# Patient Record
Sex: MALE | Race: WHITE | NOT HISPANIC OR LATINO | Employment: FULL TIME | URBAN - METROPOLITAN AREA
[De-identification: names, ages, dates, MRNs, and addresses within clinical notes are randomized per-mention and may not be internally consistent; named-entity substitution may affect disease eponyms.]

---

## 2017-09-24 ENCOUNTER — APPOINTMENT (OUTPATIENT)
Dept: RADIOLOGY | Age: 62
End: 2017-09-24
Attending: PHYSICIAN ASSISTANT
Payer: OTHER MISCELLANEOUS

## 2017-09-24 ENCOUNTER — APPOINTMENT (OUTPATIENT)
Dept: URGENT CARE | Age: 62
End: 2017-09-24
Payer: OTHER MISCELLANEOUS

## 2017-09-24 ENCOUNTER — TRANSCRIBE ORDERS (OUTPATIENT)
Dept: ADMINISTRATIVE | Age: 62
End: 2017-09-24

## 2017-09-24 DIAGNOSIS — T14.90XA INJURY: ICD-10-CM

## 2017-09-24 DIAGNOSIS — T14.90XA INJURY: Primary | ICD-10-CM

## 2017-09-24 PROCEDURE — 99283 EMERGENCY DEPT VISIT LOW MDM: CPT | Performed by: PREVENTIVE MEDICINE

## 2017-09-24 PROCEDURE — 73564 X-RAY EXAM KNEE 4 OR MORE: CPT

## 2017-09-24 PROCEDURE — G0382 LEV 3 HOSP TYPE B ED VISIT: HCPCS | Performed by: PREVENTIVE MEDICINE

## 2017-10-06 ENCOUNTER — APPOINTMENT (OUTPATIENT)
Dept: URGENT CARE | Age: 62
End: 2017-10-06
Payer: OTHER MISCELLANEOUS

## 2017-10-06 PROCEDURE — 99213 OFFICE O/P EST LOW 20 MIN: CPT | Performed by: PREVENTIVE MEDICINE

## 2017-10-18 ENCOUNTER — GENERIC CONVERSION - ENCOUNTER (OUTPATIENT)
Dept: OTHER | Facility: OTHER | Age: 62
End: 2017-10-18

## 2017-10-18 DIAGNOSIS — S83.8X2A SPRAIN OF OTHER SPECIFIED PARTS OF LEFT KNEE, INITIAL ENCOUNTER: ICD-10-CM

## 2017-10-24 ENCOUNTER — GENERIC CONVERSION - ENCOUNTER (OUTPATIENT)
Dept: OTHER | Facility: OTHER | Age: 62
End: 2017-10-24

## 2017-11-29 ENCOUNTER — ALLSCRIPTS OFFICE VISIT (OUTPATIENT)
Dept: OTHER | Facility: OTHER | Age: 62
End: 2017-11-29

## 2017-11-29 DIAGNOSIS — S83.242A OTHER TEAR OF MEDIAL MENISCUS, CURRENT INJURY, LEFT KNEE, INITIAL ENCOUNTER: ICD-10-CM

## 2018-01-10 ENCOUNTER — GENERIC CONVERSION - ENCOUNTER (OUTPATIENT)
Dept: OTHER | Facility: OTHER | Age: 63
End: 2018-01-10

## 2018-01-10 ENCOUNTER — ALLSCRIPTS OFFICE VISIT (OUTPATIENT)
Dept: OTHER | Facility: OTHER | Age: 63
End: 2018-01-10

## 2018-01-10 NOTE — PROGRESS NOTES
Preliminary Nursing Report                Patient Information    Initial Encounter Entry Date:   2017 9:48 AM EST (Automated Transmission Automated Transmission)       Last Modified:   {Eagle Costello}              Legal Name: Joceline Smith Number:        YOB: 1955        Age (years): 63        Gender: M        Body Mass Index (BMI): 28 kg/m2        Height: 65 in  Weight: 169 lbs (77 kgs)           Address:   [address]              Phone: [phone]   (consent to leave messages)        Email:        Ethnicity: Decline to State        [employer]:        Marital Status:        Preferred Language: English        Race: Other Race                    Patient Insurance Information        Primary Insurance Information Carrier Name: {Primary  CarrierName}           Carrier Address:   {Primary  CarrierAddress}              Carrier Phone: {Primary  CarrierPhone}          Group Number: {Primary  GroupNumber}          Policy Number: {Primary  PolicyNumber}          Insured Name: {Primary  InsuredName}          Insured : {Primary  InsuredDOB}          Relationship to Insured: {Primary  RelationshiptoInsured}           Secondary Insurance Information Carrier Name: {Secondary  CarrierName}           Carrier Address:   {Secondary  CarrierAddress}              Carrier Phone: {Secondary  CarrierPhone}          Group Number: {Secondary  GroupNumber}          Policy Number: {Secondary  PolicyNumber}          Insured Name: {Secondary  InsuredName}          Insured : {Secondary  InsuredDOB}          Relationship to Insured: {Secondary  RelationshiptoInsured}                       Health Profile   Booking #:   Gonzalo Fernandes #: [id number]               DOS: 2017    Surgery : KNEE ARTHROSCOPY/SURGERY    Rosette Singletary Procedures/Notes:     Surgery Risk: Intermediate          Precautions          Allergies    No Known Drug Allergies             Medications Coumadin 6 MG Oral Tablet       Metoprolol Tartrate 50 MG Oral Tablet               Conditions    Acute medial meniscal tear, left, initial encounter       Injury of meniscus of left knee               Family History    None             Surgical History    None             Social History    Never a smoker                               Patient Instructions       Medical Procedure Risk  NPO Instructions   The day before surgery it is recommended to have a light dinner at your usual time and you are allowed a light snack early in the evening  Do not eat anything heavy or eat a big meal after 7pm  Do not eat or drink anything after midnight prior to your surgery  If you are supposed to take any of your medications, do so with a sip of water  Failure to follow these instructions can lead to an increased risk of lung complications and may result in a delay or cancellation of your procedure  If you have any questions, contact your institution for further instructions  No candy, no gum, no mints, no chewing tobacco          Metoprolol Tartrate 50 MG Oral Tablet  Medication Instruction (Beta Blocker) 3, 2, c, 4, 6, 5  Please continue to take this medication on your normal schedule  If this is an oral medication and you take in the morning, you may do so with a sip of water  Coumadin 6 MG Oral Tablet  Medication Instruction (Coumadin -Blood Thinners) 21, 22, 23  Please stop Coumadin for 5 days prior to surgery with prescribing physician consultation - bridging anticoagulation may be needed if you have an artificial heart valve  Testing Considerations       ? Coagulation Tests (PT/PTT/INR) t  Triggered by: Coumadin 6 MG Oral Tablet         ? Electrocardiogram (ECG) t  Patient does not need new test if normal ECG is present within the last six months and no change in clinical condition  Triggered by: Age or Facility Rec               Consultations       No recommendations for this classification  Miscellaneous Questions         Question: Are you able to walk up a flight of stairs, walk up a hill or do heavy housework WITHOUT having chest pain or shortness of breath? Answer: YES                   Allergies/Conditions/Medications Not Found        The following were not recognized by our system when generating the recommendations  Please consider if this would impact any preoperative protocols  ? Never a smoker                  Appointment Information         Date:    12/28/2017        Location:    [facility]        Address:           Directions:                      Footnotes revision 14      ?? Denotes a free-text entry  Legal Disclaimer: Any and all recommendations and services provided herein are designed to assist in the preoperative care of the patient  Nothing contained herein is designed to replace, eliminate or alleviate the responsibility of the attending physician to supervise and determine the patient?s preoperative care and course of treatment  Failure to provide complete, accurate information may negatively impact the system?s ability to recommend the proper preoperative protocol  THE ATTENDING PHYSICIAN IS RESPONSIBLE TO REVIEW THE SUGGESTED PREOPERATIVE PROTOCOLS/COURSE OF TREATMENT AND PRESCRIBE THE FINAL COURSE OF PREOPERATIVE TREATMENT IN CONSULTATION WITH THE PATIENT  THE ePREOP SYSTEM AND ITS MATERIALS ARE PROVIDED ? AS IS? WITHOUT WARRANTY OF ANY KIND, EXPRESS OR IMPLIED, INCLUDING, BUT NOT LIMITED TO, WARRANTIES OF PERFORMANCE OR MERCHANTABILITY OR FITNESS FOR A PARTICULAR PURPOSE  PATIENT AND PHYSICIANS HEREBY AGREE THAT THEIR USE OF THE MATERIALS AND RESOURCES ACT AS A CONSENT TO RELEASE AND WAIVE ePREOP FROM ANY AND ALL CLAIMS OF WARRANTY, TORT OR CONTRACT LAW OF ANY KIND

## 2018-01-13 VITALS
HEART RATE: 71 BPM | WEIGHT: 169 LBS | DIASTOLIC BLOOD PRESSURE: 62 MMHG | SYSTOLIC BLOOD PRESSURE: 121 MMHG | RESPIRATION RATE: 20 BRPM

## 2018-01-22 VITALS
DIASTOLIC BLOOD PRESSURE: 83 MMHG | SYSTOLIC BLOOD PRESSURE: 128 MMHG | WEIGHT: 165 LBS | HEIGHT: 65 IN | BODY MASS INDEX: 27.49 KG/M2 | RESPIRATION RATE: 20 BRPM | HEART RATE: 82 BPM

## 2018-01-23 VITALS
BODY MASS INDEX: 28.19 KG/M2 | HEART RATE: 80 BPM | WEIGHT: 169.38 LBS | DIASTOLIC BLOOD PRESSURE: 73 MMHG | RESPIRATION RATE: 20 BRPM | SYSTOLIC BLOOD PRESSURE: 114 MMHG

## 2018-01-23 RX ORDER — WARFARIN SODIUM 2 MG/1
6 TABLET ORAL
COMMUNITY

## 2018-01-23 RX ORDER — METOPROLOL SUCCINATE 50 MG/1
25 TABLET, EXTENDED RELEASE ORAL DAILY
COMMUNITY

## 2018-01-23 RX ORDER — ACETAMINOPHEN 500 MG
500 TABLET ORAL EVERY 6 HOURS PRN
COMMUNITY

## 2018-01-23 RX ORDER — MULTIVITAMIN
1 TABLET ORAL DAILY
COMMUNITY

## 2018-01-23 NOTE — PROGRESS NOTES
Preliminary Nursing Report                Patient Information    Initial Encounter Entry Date:   1/10/2018 9:28 AM EST (Automated Transmission Automated Transmission)       Last Modified:   {Eagle Costello}              Legal Name: Joceline Smith Number:        YOB: 1955        Age (years): 63        Gender: M        Body Mass Index (BMI): 28 kg/m2        Height: 65 in  Weight: 169 lbs (77 kgs)           Address:   [place] [id number]              Phone: [phone]   (consent to leave messages)        Email:        Ethnicity: Decline to State        [employer]oo:        Marital Status:        Preferred Language: English        Race: Other Race                    Patient Insurance Information        Primary Insurance Information Carrier Name: {Primary  CarrierName}           Carrier Address:   {Primary  CarrierAddress}              Carrier Phone: {Primary  CarrierPhone}          Group Number: {Primary  GroupNumber}          Policy Number: {Primary  PolicyNumber}          Insured Name: {Primary  InsuredName}          Insured : {Primary  InsuredDOB}          Relationship to Insured: {Primary  RelationshiptoInsured}           Secondary Insurance Information Carrier Name: {Secondary  CarrierName}           Carrier Address:   {Secondary  CarrierAddress}              Carrier Phone: {Secondary  CarrierPhone}          Group Number: {Secondary  GroupNumber}          Policy Number: {Secondary  PolicyNumber}          Insured Name: {Secondary  InsuredName}          Insured : {Secondary  InsuredDOB}          Relationship to Insured: {Secondary  RelationshiptoInsured}                       Health Profile   Booking #:   Amari Guzman #: [id number]               DOS: 2018    Surgery : KNEE ARTHROSCOPY/SURGERY    Saurav Singletary Procedures/Notes:     Surgery Risk: Intermediate          Precautions          Allergies    No Known Drug Allergies             Medications Coumadin 6 MG Oral Tablet       Metoprolol Tartrate 50 MG Oral Tablet               Conditions    Acute medial meniscal tear, left, initial encounter       Injury of meniscus of left knee               Family History    None             Surgical History    None             Social History    Never a smoker                               Patient Instructions       Medical Procedure Risk  NPO Instructions   The day before surgery it is recommended to have a light dinner at your usual time and you are allowed a light snack early in the evening  Do not eat anything heavy or eat a big meal after 7pm  Do not eat or drink anything after midnight prior to your surgery  If you are supposed to take any of your medications, do so with a sip of water  Failure to follow these instructions can lead to an increased risk of lung complications and may result in a delay or cancellation of your procedure  If you have any questions, contact your institution for further instructions  No candy, no gum, no mints, no chewing tobacco          Metoprolol Tartrate 50 MG Oral Tablet  Medication Instruction (Beta Blocker) 3, 2, c, 4, 6, 5  Please continue to take this medication on your normal schedule  If this is an oral medication and you take in the morning, you may do so with a sip of water  Coumadin 6 MG Oral Tablet  Medication Instruction (Coumadin -Blood Thinners) 21, 22, 23  Please stop Coumadin for 5 days prior to surgery with prescribing physician consultation - bridging anticoagulation may be needed if you have an artificial heart valve  Testing Considerations       ? Coagulation Tests (PT/PTT/INR) t  Triggered by: Coumadin 6 MG Oral Tablet         ? Electrocardiogram (ECG) t  Patient does not need new test if normal ECG is present within the last six months and no change in clinical condition  Triggered by: Age or Facility Rec               Consultations       No recommendations for this classification  Miscellaneous Questions         Question: Are you able to walk up a flight of stairs, walk up a hill or do heavy housework WITHOUT having chest pain or shortness of breath? Answer: YES                   Allergies/Conditions/Medications Not Found        The following were not recognized by our system when generating the recommendations  Please consider if this would impact any preoperative protocols  ? Never a smoker                  Appointment Information         Date:    01/25/2018        Location:    [facility]        Address:           Directions:                      Footnotes revision 14      ?? Denotes a free-text entry  Legal Disclaimer: Any and all recommendations and services provided herein are designed to assist in the preoperative care of the patient  Nothing contained herein is designed to replace, eliminate or alleviate the responsibility of the attending physician to supervise and determine the patient?s preoperative care and course of treatment  Failure to provide complete, accurate information may negatively impact the system?s ability to recommend the proper preoperative protocol  THE ATTENDING PHYSICIAN IS RESPONSIBLE TO REVIEW THE SUGGESTED PREOPERATIVE PROTOCOLS/COURSE OF TREATMENT AND PRESCRIBE THE FINAL COURSE OF PREOPERATIVE TREATMENT IN CONSULTATION WITH THE PATIENT  THE ePREOP SYSTEM AND ITS MATERIALS ARE PROVIDED ? AS IS? WITHOUT WARRANTY OF ANY KIND, EXPRESS OR IMPLIED, INCLUDING, BUT NOT LIMITED TO, WARRANTIES OF PERFORMANCE OR MERCHANTABILITY OR FITNESS FOR A PARTICULAR PURPOSE  PATIENT AND PHYSICIANS HEREBY AGREE THAT THEIR USE OF THE MATERIALS AND RESOURCES ACT AS A CONSENT TO RELEASE AND WAIVE ePREOP FROM ANY AND ALL CLAIMS OF WARRANTY, TORT OR CONTRACT LAW OF ANY KIND

## 2018-01-23 NOTE — PRE-PROCEDURE INSTRUCTIONS
Pre-Surgery Instructions:   Medication Instructions    acetaminophen (TYLENOL) 500 mg tablet Patient was instructed by Physician and understands   enoxaparin (LOVENOX) 80 mg/0 8 mL Patient was instructed by Physician and understands   metoprolol succinate (TOPROL-XL) 50 mg 24 hr tablet Patient was instructed by Physician and understands   Multiple Vitamin (MULTIVITAMIN) tablet Patient was instructed by Physician and understands   warfarin (JANTOVEN) 2 mg tablet Patient was instructed by Physician and understands  PT WILL TAKE METOPR AM OF SURG  LD ENOXAP INJ AM 1/24 (STOPPED WARFARIN)  INSTR ALSO ON ASC LOC, JUDY  CALL, SHOWER REV , BRING PHOTO ID, MED LIST, INS INFO  Acetaminophen Med Class     Continue to take this medication on your normal schedule  If this is an oral medication and you take it in the morning, then you may take this medicine with a sip of water  Beta blocker Med Class     Continue to take this heart medication on your normal schedule  If this is an oral medication and you take it in the morning, then you may take this medicine with a sip of water  Low Molecular Weight Heparin Med Class     Stop taking this medication at least 12-24 hours prior to surgery/procedure with prescribing Physician and Surgeon consultation  Warfarin Med Class     Consult with your Surgeon and prescribing Physician for exact timing of stopping this medication  Bridging anticoagulation may be needed if you have an artificial heart valve  Frequently, this medication is stopped 5 days prior to surgery  Approval to stop this medication should first come from your surgeon and/or prescribing physician

## 2018-01-24 ENCOUNTER — ANESTHESIA EVENT (OUTPATIENT)
Dept: PERIOP | Facility: HOSPITAL | Age: 63
End: 2018-01-24
Payer: OTHER MISCELLANEOUS

## 2018-01-25 ENCOUNTER — HOSPITAL ENCOUNTER (OUTPATIENT)
Facility: HOSPITAL | Age: 63
Setting detail: OUTPATIENT SURGERY
Discharge: HOME | End: 2018-01-25
Attending: ORTHOPAEDIC SURGERY | Admitting: ORTHOPAEDIC SURGERY
Payer: OTHER MISCELLANEOUS

## 2018-01-25 ENCOUNTER — ANESTHESIA (OUTPATIENT)
Dept: PERIOP | Facility: HOSPITAL | Age: 63
End: 2018-01-25
Payer: OTHER MISCELLANEOUS

## 2018-01-25 VITALS
SYSTOLIC BLOOD PRESSURE: 109 MMHG | RESPIRATION RATE: 20 BRPM | HEART RATE: 72 BPM | TEMPERATURE: 98.9 F | HEIGHT: 65 IN | WEIGHT: 169 LBS | BODY MASS INDEX: 28.16 KG/M2 | DIASTOLIC BLOOD PRESSURE: 79 MMHG | OXYGEN SATURATION: 98 %

## 2018-01-25 DIAGNOSIS — Z98.890 STATUS POST ARTHROSCOPY OF LEFT KNEE: Primary | ICD-10-CM

## 2018-01-25 LAB
ABO GROUP BLD: NORMAL
APTT PPP: 33 SECONDS (ref 23–35)
BLD GP AB SCN SERPL QL: NEGATIVE
INR PPP: 0.97 (ref 0.86–1.16)
PROTHROMBIN TIME: 12.9 SECONDS (ref 12.1–14.4)
RH BLD: POSITIVE
SPECIMEN EXPIRATION DATE: NORMAL

## 2018-01-25 PROCEDURE — 29881 ARTHRS KNE SRG MNISECTMY M/L: CPT | Performed by: ORTHOPAEDIC SURGERY

## 2018-01-25 PROCEDURE — 86900 BLOOD TYPING SEROLOGIC ABO: CPT | Performed by: ORTHOPAEDIC SURGERY

## 2018-01-25 PROCEDURE — 86901 BLOOD TYPING SEROLOGIC RH(D): CPT | Performed by: ORTHOPAEDIC SURGERY

## 2018-01-25 PROCEDURE — 85730 THROMBOPLASTIN TIME PARTIAL: CPT | Performed by: ORTHOPAEDIC SURGERY

## 2018-01-25 PROCEDURE — 86850 RBC ANTIBODY SCREEN: CPT | Performed by: ORTHOPAEDIC SURGERY

## 2018-01-25 PROCEDURE — 85610 PROTHROMBIN TIME: CPT | Performed by: ORTHOPAEDIC SURGERY

## 2018-01-25 RX ORDER — ONDANSETRON 2 MG/ML
4 INJECTION INTRAMUSCULAR; INTRAVENOUS ONCE AS NEEDED
Status: DISCONTINUED | OUTPATIENT
Start: 2018-01-25 | End: 2018-01-25 | Stop reason: HOSPADM

## 2018-01-25 RX ORDER — MIDAZOLAM HYDROCHLORIDE 1 MG/ML
INJECTION INTRAMUSCULAR; INTRAVENOUS AS NEEDED
Status: DISCONTINUED | OUTPATIENT
Start: 2018-01-25 | End: 2018-01-25 | Stop reason: SURG

## 2018-01-25 RX ORDER — PROPOFOL 10 MG/ML
INJECTION, EMULSION INTRAVENOUS AS NEEDED
Status: DISCONTINUED | OUTPATIENT
Start: 2018-01-25 | End: 2018-01-25 | Stop reason: SURG

## 2018-01-25 RX ORDER — OXYCODONE HYDROCHLORIDE 5 MG/1
TABLET ORAL
Qty: 30 TABLET | Refills: 0 | Status: CANCELLED | OUTPATIENT
Start: 2018-01-25

## 2018-01-25 RX ORDER — SODIUM CHLORIDE, SODIUM LACTATE, POTASSIUM CHLORIDE, CALCIUM CHLORIDE 600; 310; 30; 20 MG/100ML; MG/100ML; MG/100ML; MG/100ML
20 INJECTION, SOLUTION INTRAVENOUS CONTINUOUS
Status: DISCONTINUED | OUTPATIENT
Start: 2018-01-25 | End: 2018-01-25 | Stop reason: HOSPADM

## 2018-01-25 RX ORDER — LIDOCAINE HYDROCHLORIDE AND EPINEPHRINE 10; 10 MG/ML; UG/ML
INJECTION, SOLUTION INFILTRATION; PERINEURAL AS NEEDED
Status: DISCONTINUED | OUTPATIENT
Start: 2018-01-25 | End: 2018-01-25 | Stop reason: HOSPADM

## 2018-01-25 RX ORDER — FENTANYL CITRATE/PF 50 MCG/ML
25 SYRINGE (ML) INJECTION
Status: CANCELLED | OUTPATIENT
Start: 2018-01-25

## 2018-01-25 RX ORDER — OXYCODONE HYDROCHLORIDE 5 MG/1
TABLET ORAL
Qty: 30 TABLET | Refills: 0 | Status: SHIPPED | OUTPATIENT
Start: 2018-01-25 | End: 2018-04-25

## 2018-01-25 RX ORDER — METHYLPREDNISOLONE ACETATE 40 MG/ML
INJECTION, SUSPENSION INTRA-ARTICULAR; INTRALESIONAL; INTRAMUSCULAR; SOFT TISSUE AS NEEDED
Status: DISCONTINUED | OUTPATIENT
Start: 2018-01-25 | End: 2018-01-25 | Stop reason: HOSPADM

## 2018-01-25 RX ORDER — LIDOCAINE HYDROCHLORIDE 10 MG/ML
INJECTION, SOLUTION INFILTRATION; PERINEURAL AS NEEDED
Status: DISCONTINUED | OUTPATIENT
Start: 2018-01-25 | End: 2018-01-25 | Stop reason: SURG

## 2018-01-25 RX ORDER — BUPIVACAINE HYDROCHLORIDE 2.5 MG/ML
INJECTION, SOLUTION INFILTRATION; PERINEURAL AS NEEDED
Status: DISCONTINUED | OUTPATIENT
Start: 2018-01-25 | End: 2018-01-25 | Stop reason: HOSPADM

## 2018-01-25 RX ORDER — EPHEDRINE SULFATE 50 MG/ML
INJECTION, SOLUTION INTRAVENOUS AS NEEDED
Status: DISCONTINUED | OUTPATIENT
Start: 2018-01-25 | End: 2018-01-25 | Stop reason: SURG

## 2018-01-25 RX ORDER — FENTANYL CITRATE/PF 50 MCG/ML
25 SYRINGE (ML) INJECTION
Status: DISCONTINUED | OUTPATIENT
Start: 2018-01-25 | End: 2018-01-25 | Stop reason: HOSPADM

## 2018-01-25 RX ORDER — ONDANSETRON 2 MG/ML
INJECTION INTRAMUSCULAR; INTRAVENOUS AS NEEDED
Status: DISCONTINUED | OUTPATIENT
Start: 2018-01-25 | End: 2018-01-25 | Stop reason: SURG

## 2018-01-25 RX ORDER — FENTANYL CITRATE 50 UG/ML
INJECTION, SOLUTION INTRAMUSCULAR; INTRAVENOUS AS NEEDED
Status: DISCONTINUED | OUTPATIENT
Start: 2018-01-25 | End: 2018-01-25 | Stop reason: SURG

## 2018-01-25 RX ORDER — ONDANSETRON 2 MG/ML
4 INJECTION INTRAMUSCULAR; INTRAVENOUS ONCE AS NEEDED
Status: CANCELLED | OUTPATIENT
Start: 2018-01-25

## 2018-01-25 RX ADMIN — SODIUM CHLORIDE, SODIUM LACTATE, POTASSIUM CHLORIDE, AND CALCIUM CHLORIDE: .6; .31; .03; .02 INJECTION, SOLUTION INTRAVENOUS at 10:00

## 2018-01-25 RX ADMIN — FENTANYL CITRATE 25 MCG: 50 INJECTION INTRAMUSCULAR; INTRAVENOUS at 12:39

## 2018-01-25 RX ADMIN — EPHEDRINE SULFATE 5 MG: 50 INJECTION, SOLUTION INTRAMUSCULAR; INTRAVENOUS; SUBCUTANEOUS at 11:35

## 2018-01-25 RX ADMIN — CEFAZOLIN SODIUM 2000 MG: 2 SOLUTION INTRAVENOUS at 11:30

## 2018-01-25 RX ADMIN — EPHEDRINE SULFATE 5 MG: 50 INJECTION, SOLUTION INTRAMUSCULAR; INTRAVENOUS; SUBCUTANEOUS at 11:50

## 2018-01-25 RX ADMIN — LIDOCAINE HYDROCHLORIDE 50 MG: 10 INJECTION, SOLUTION INFILTRATION; PERINEURAL at 11:20

## 2018-01-25 RX ADMIN — FENTANYL CITRATE 25 MCG: 50 INJECTION, SOLUTION INTRAMUSCULAR; INTRAVENOUS at 11:24

## 2018-01-25 RX ADMIN — HYDROMORPHONE HYDROCHLORIDE 0.25 MG: 1 INJECTION, SOLUTION INTRAMUSCULAR; INTRAVENOUS; SUBCUTANEOUS at 12:15

## 2018-01-25 RX ADMIN — FENTANYL CITRATE 50 MCG: 50 INJECTION, SOLUTION INTRAMUSCULAR; INTRAVENOUS at 11:53

## 2018-01-25 RX ADMIN — MIDAZOLAM HYDROCHLORIDE 2 MG: 1 INJECTION, SOLUTION INTRAMUSCULAR; INTRAVENOUS at 11:10

## 2018-01-25 RX ADMIN — FENTANYL CITRATE 25 MCG: 50 INJECTION, SOLUTION INTRAMUSCULAR; INTRAVENOUS at 11:20

## 2018-01-25 RX ADMIN — ONDANSETRON 4 MG: 2 INJECTION INTRAMUSCULAR; INTRAVENOUS at 12:16

## 2018-01-25 RX ADMIN — PROPOFOL 200 MG: 10 INJECTION, EMULSION INTRAVENOUS at 11:20

## 2018-01-25 RX ADMIN — EPHEDRINE SULFATE 5 MG: 50 INJECTION, SOLUTION INTRAMUSCULAR; INTRAVENOUS; SUBCUTANEOUS at 11:40

## 2018-01-25 RX ADMIN — SODIUM CHLORIDE, SODIUM LACTATE, POTASSIUM CHLORIDE, AND CALCIUM CHLORIDE 20 ML/HR: .6; .31; .03; .02 INJECTION, SOLUTION INTRAVENOUS at 08:42

## 2018-01-25 RX ADMIN — SODIUM CHLORIDE, SODIUM LACTATE, POTASSIUM CHLORIDE, AND CALCIUM CHLORIDE 20 ML/HR: .6; .31; .03; .02 INJECTION, SOLUTION INTRAVENOUS at 12:55

## 2018-01-25 NOTE — H&P (VIEW-ONLY)
Preliminary Nursing Report                Patient Information    Initial Encounter Entry Date:   1/10/2018 9:28 AM EST (Automated Transmission Automated Transmission)       Last Modified:                 Legal Name: Joceline Mobiveil Sarah Number:        YOB: 1955        Age (years): 63        Gender: M        Body Mass Index (BMI): 28 kg/m2        Height: 65 in  Weight: 169 lbs (77 kgs)           Address:   [address]              Phone: [phone]   (consent to leave messages)        Email:        Ethnicity: Decline to State        Jainism:        Marital Status:        Preferred Language: English        Race: Other Race                    Patient Insurance Information        Primary Insurance Information Carrier Name:            Carrier Address:                 Carrier Phone:           Group Number:           Policy Number:           Insured Name:           Insured :           Relationship to Insured:            Secondary Insurance Information Carrier Name:            Carrier Address:                 Carrier Phone:           Group Number:           Policy Number:           Insured Name:           Mauricio Bowers :           Relationship to Insured:                        [id number]  #:   Tyronechetna Clau #: [id number]               DOS: 2018    Surgery : KNEE ARTHROSCOPY/SURGERY    Reese Ranjana     Add'l Procedures/Notes:     Surgery Risk: Intermediate          Precautions          Allergies    No Known Drug Allergies             Medications    Coumadin 6 MG Oral Tablet       Metoprolol Tartrate 50 MG Oral Tablet               Conditions    Acute medial meniscal tear, left, initial encounter       Injury of meniscus of left knee               Family History    None             Surgical History    None             Social History    Never a smoker                               Patient Instructions       Medical Procedure Risk  NPO Instructions   The day before surgery it is recommended to have a light dinner at your usual time and you are allowed a light snack early in the evening  Do not eat anything heavy or eat a big meal after 7pm  Do not eat or drink anything after midnight prior to your surgery  If you are supposed to take any of your medications, do so with a sip of water  Failure to follow these instructions can lead to an increased risk of lung complications and may result in a delay or cancellation of your procedure  If you have any questions, contact your institution for further instructions  No candy, no gum, no mints, no chewing tobacco          Metoprolol Tartrate 50 MG Oral Tablet  Medication Instruction (Beta Blocker) 3, 2, c, 4, 6, 5  Please continue to take this medication on your normal schedule  If this is an oral medication and you take in the morning, you may do so with a sip of water  Coumadin 6 MG Oral Tablet  Medication Instruction (Coumadin -Blood Thinners) 21, 22, 23  Please stop Coumadin for 5 days prior to surgery with prescribing physician consultation - bridging anticoagulation may be needed if you have an artificial heart valve  Testing Considerations       ? Coagulation Tests (PT/PTT/INR) t  Triggered by: Coumadin 6 MG Oral Tablet         ? Electrocardiogram (ECG) t  Patient does not need new test if normal ECG is present within the last six months and no change in clinical condition  Triggered by: Age or Facility Rec               Consultations       No recommendations for this classification  Miscellaneous Questions         Question: Are you able to walk up a flight of stairs, walk up a hill or do heavy housework WITHOUT having chest pain or shortness of breath? Answer: YES                   Allergies/Conditions/Medications Not Found        The following were not recognized by our system when generating the recommendations   Please consider if this would impact any preoperative protocols  ? Never a smoker                  Appointment Information         Date:    01/25/2018        Location:    [facility]        Address:           Directions:                      Footnotes revision 14      ?? Denotes a free-text entry  Legal Disclaimer: Any and all recommendations and services provided herein are designed to assist in the preoperative care of the patient  Nothing contained herein is designed to replace, eliminate or alleviate the responsibility of the attending physician to supervise and determine the patient?s preoperative care and course of treatment  Failure to provide complete, accurate information may negatively impact the system?s ability to recommend the proper preoperative protocol  THE ATTENDING PHYSICIAN IS RESPONSIBLE TO REVIEW THE SUGGESTED PREOPERATIVE PROTOCOLS/COURSE OF TREATMENT AND PRESCRIBE THE FINAL COURSE OF PREOPERATIVE TREATMENT IN CONSULTATION WITH THE PATIENT  THE ePREOP SYSTEM AND ITS MATERIALS ARE PROVIDED ? AS IS? WITHOUT WARRANTY OF ANY KIND, EXPRESS OR IMPLIED, INCLUDING, BUT NOT LIMITED TO, WARRANTIES OF PERFORMANCE OR MERCHANTABILITY OR FITNESS FOR A PARTICULAR PURPOSE  PATIENT AND PHYSICIANS HEREBY AGREE THAT THEIR USE OF THE MATERIALS AND RESOURCES ACT AS A CONSENT TO RELEASE AND WAIVE ePREOP FROM ANY AND ALL CLAIMS OF WARRANTY, TORT OR CONTRACT LAW OF ANY KIND

## 2018-01-25 NOTE — OP NOTE
[id number]                                                                                                        Iveth Beavers                                                                                                         Unit/Bed#:   PreOp Dx: left Medial meniscal tear     Postop Dx: left Medial meniscal tear    Procedure: left knee arthroscopy, partial Medial meniscectomy, synovectomy, chondroplasty    Surgeon: Ailin Casey MD, SCARLET Martinez    Fluids:     EBL: Minimal    Drains: None    Specimens: None    Complications: None    Condition: Extubated, stable, just to the postanesthesia care unit    Implant: None    63 y o  male , who presents at this time, secondary to left  knee pain, mechanical symptoms of locking  Radiographs were significant for Medial meniscal tears  Patient failed conservative treatment therefore, he is presenting for operative intervention for this pathology  The patient was told of all the pros and cons of operative and nonoperative intervention  Some of the complications of operative intervention include infection, bleeding, scarring, nerve injury, vascular injury, continued pain, decreased range of motion, DVT, PE death, loss of limb, need for further surgery, not obtain an results  The patient wished  Patient did consent for operative intervention for this pathology  Patient was brought to the operating room  Patient was anesthetized as anesthesia team  Patient was prepped and draped in normal sterile fashion  After this was done, we did conduct a time out to make sure correct  Patient was in the room, prepped marked and draped  Implants were in the room, Rep  For the implants were present, DVT prophylaxis and antibiotics were dressed  1% lidocaine with epinephrine was injected to the anterolateral and anteromedial portal sites  Our anterolateral incision was made through skin and fatty tissue and capsular region   Straight hemostat was used to widen our capsule  This was used as a camera portal  Findings as follows    Patellofemoral joint line: 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    Lateral gutter:  No loose bodies    Medial gutter:  No loose bodies    Lateral tibiofemoral compartment:   No chondromalacia noted over the tibial plateau or the lateral femoral condyle  Menisci and noted to be intact    Intercondylar notch:  ACL and PCL are intact    Medial tibiofemoral compartment:  Large radial tear of the body/posterior horn region of the medial meniscus; chondromalacia grade 2/3 medial femoral condyle    An anteromedial incision was made for our working portal   We were able to  Use biters and Cinthya in order to debride the meniscus  A portion of the meniscus had to be debrided all the way towards the capsular region  We did use a shaver to do chondroplasty and synovectomy was done sparingly in order to allow for better visualization    We did suction  The intra-articular region of the fluid  3 0 nylon sutures were used to reapproximate our 2 incision sites  Lidocaine, Marcaine, and 40 mg of Depo-Medrol was administered to intra-articular region  Wounds were cleaned and dried  Xeroform, 4 x 4, ABDs and web roll, and Ace bandage was placed   Patient was subsequently awakened as per anesthesia team, and noted to be in stable condition and transferred to postanesthesia care unit

## 2018-01-25 NOTE — DISCHARGE INSTRUCTIONS
Discharge Instructions - [address] 63 y o  male MRN: [id number]  Unit/Bed#: Operating Room    Weight Bearing Status:                                           Weight bearing as tolerated left lower extremity    Pain:  Continue analgesics as directed    Dressing Instructions: May remove dressing 01/28/2018  Keep incision clean, dry and intact until follow up appointment  PT/OT:  Continue PT/OT on outpatient basis as directed    Appt Instructions: If you do not have your appointment, please call the clinic at [phone] to f/u with Dr Javed Honeycutt in 2 weeks  Otherwise followup as scheduled below:      Contact the office sooner if you experience any increased numbness/tingling in the extremities

## 2018-01-25 NOTE — ANESTHESIA POSTPROCEDURE EVALUATION
Post-Op Assessment Note      CV Status:  Stable    Mental Status:  Alert and awake    Hydration Status:  Stable    PONV Controlled:  Controlled    Airway Patency:  Patent    Post Op Vitals Reviewed: Yes          Staff: CRNA           BP (P) 124/58 (01/25/18 1230)    Temp (P) 99 1 °F (37 3 °C) (01/25/18 1230)    Pulse (P) 67 (01/25/18 1230)   Resp (P) 18 (01/25/18 1230)    SpO2

## 2018-01-25 NOTE — ANESTHESIA PREPROCEDURE EVALUATION
Review of Systems/Medical History  Patient summary reviewed        Cardiovascular  Dysrhythmias, atrial fibrillation,   Comment: H/O cardiac surgery,  Pulmonary       GI/Hepatic      Comment: Gilbert's          Endo/Other    Comment: Henry's syndrome    GYN       Hematology   Musculoskeletal       Neurology   Psychology                Anesthesia Plan  ASA Score- 3     Anesthesia Type- general with ASA Monitors  Additional Monitors:   Airway Plan: LMA  Plan Factors-    Induction- intravenous  Postoperative Plan-     Informed Consent- Anesthetic plan and risks discussed with patient

## 2018-01-26 NOTE — TELEPHONE ENCOUNTER
Patient is calling again because he needs a script for therapy  Patient will be going to [facility] on [address] in [facility]  The fax # is [phone]  Please let patient know when this is faxed

## 2018-01-29 NOTE — TELEPHONE ENCOUNTER
Patient aware that PT was ordered and we will fax the RX to [facility] on [address] in [facility]  Patient would like to know if he is okay to shower as he has a wick in his leg  Please advise

## 2018-01-30 ENCOUNTER — TELEPHONE (OUTPATIENT)
Dept: OBGYN CLINIC | Facility: HOSPITAL | Age: 63
End: 2018-01-30

## 2018-01-30 DIAGNOSIS — Z98.890 S/P ARTHROSCOPY: Primary | ICD-10-CM

## 2018-01-30 NOTE — TELEPHONE ENCOUNTER
Caller: Debbie Portlilos  Call back number: [phone]  Fax number: [phone]  Patient's doctor: Dr Armani Saunders    Patient had surgery 1/25  He has not started PT yet  He is unsure when he is supposed to start that  Patient would like to speak to someone about this  Red Batters needs a script for Pt, please fax   Please advise

## 2018-02-01 NOTE — TELEPHONE ENCOUNTER
Spoke to pt and relayed the message  He was concerned because it was a white string coming out along with the black sutures  Melo Gaines was right here as I was talking to the pt and she advised that that's what they look like  I relayed that message to pt as well

## 2018-02-07 ENCOUNTER — OFFICE VISIT (OUTPATIENT)
Dept: OBGYN CLINIC | Facility: MEDICAL CENTER | Age: 63
End: 2018-02-07

## 2018-02-07 VITALS
SYSTOLIC BLOOD PRESSURE: 114 MMHG | HEART RATE: 80 BPM | HEIGHT: 65 IN | WEIGHT: 165 LBS | DIASTOLIC BLOOD PRESSURE: 74 MMHG | BODY MASS INDEX: 27.49 KG/M2

## 2018-02-07 DIAGNOSIS — Z98.890 S/P LEFT KNEE ARTHROSCOPY: Primary | ICD-10-CM

## 2018-02-07 PROCEDURE — 99024 POSTOP FOLLOW-UP VISIT: CPT | Performed by: ORTHOPAEDIC SURGERY

## 2018-02-07 NOTE — PROGRESS NOTES
63 y o male status post left knee arthroscopy partial medial meniscectomy and associated procedures  Patient states that his pain is improving he does have some pain    Review of Systems  Review of systems negative unless otherwise specified in HPI    Past Medical History  Past Medical History:   Diagnosis Date    A-fib [facility])     Acquired solitary kidney     from birth   [facility] Gilbert's syndrome     Groin abscess     current        abnormal labs per patient    Henry's syndrome     Scoliosis        Past Surgical History  Past Surgical History:   Procedure Laterality Date    CARDIAC SURGERY      mitral valve' pericardial window    CHOLECYSTECTOMY      HERNIA REPAIR      IN KNEE SCOPE,MED/LAT MENISECTOMY Left 1/25/2018    Procedure: KNEE ARTHROSCOPY, PARTIAL MEDIAL MENISCECTOMY, CHONDROPLASTY;  Surgeon: Mitra Hardy MD;  [facility];  Service: Orthopedics    ROTATOR CUFF REPAIR         Current Medications  Current Outpatient Prescriptions on File Prior to Visit   Medication Sig Dispense Refill    acetaminophen (TYLENOL) 500 mg tablet Take 500 mg by mouth every 6 (six) hours as needed for mild pain      enoxaparin (LOVENOX) 80 mg/0 8 mL Inject 80 mg under the skin 2 (two) times a day 3 days preop      metoprolol succinate (TOPROL-XL) 50 mg 24 hr tablet Take 25 mg by mouth daily      Misc  Devices (CRUTCHES-ALUMINUM) MISC 1 Units by Does not apply route continuous for 30 days 2 each 0    Multiple Vitamin (MULTIVITAMIN) tablet Take 1 tablet by mouth daily      oxyCODONE (ROXICODONE) 5 mg immediate release tablet Take 1-2 tablets every 4-6 hrs as needed for pain control 30 tablet 0    warfarin (JANTOVEN) 2 mg tablet Take 6 mg by mouth daily       No current facility-administered medications on file prior to visit          Recent Labs [facility] [address])  @LABALLVALUEIP(HCT,HGB,WBC,PT,INR,ESR,CRP,GLUCOSE,HGBA1C)@      Physical exam  Left knee:  Incision site well approximated  Minimal effusion  Mild medial joint line tenderness  No lateral joint line tenderness  Neurologically and vascularly intact distally    Imaging      Procedure      Assessment/Plan:   62 y o male status post left knee arthroscopy partial medial meniscectomy, chondroplasty  Plan is as follows: He weightbearing as tolerated    Physical therapy    Follow-up in 4 weeks    Discussed treatment plan with patient and he is in agreement treatment plan    Thank you

## 2018-02-08 ENCOUNTER — TELEPHONE (OUTPATIENT)
Dept: OBGYN CLINIC | Facility: HOSPITAL | Age: 63
End: 2018-02-08

## 2018-02-08 NOTE — TELEPHONE ENCOUNTER
Bairon Daiz is calling wondering what the patients work status is as of his last visit  Would like the note faxed to [phone]      Tonie patient

## 2018-02-26 NOTE — MISCELLANEOUS
Message  Return to work or school:   Isiah Baltazar is under my professional care  He was seen in my office on 01/10/2018    He is not able to return to work until After further follow-up      Bhargav Cardona  Signatures   Electronically signed by :  Alicia Velarde, [facility]; Jan 12 2018  2:38PM EST                       (Author)

## 2018-03-21 ENCOUNTER — OFFICE VISIT (OUTPATIENT)
Dept: OBGYN CLINIC | Facility: MEDICAL CENTER | Age: 63
End: 2018-03-21

## 2018-03-21 VITALS
HEART RATE: 73 BPM | SYSTOLIC BLOOD PRESSURE: 106 MMHG | RESPIRATION RATE: 18 BRPM | WEIGHT: 170 LBS | BODY MASS INDEX: 28.29 KG/M2 | DIASTOLIC BLOOD PRESSURE: 71 MMHG

## 2018-03-21 DIAGNOSIS — Z09 POSTOP CHECK: Primary | ICD-10-CM

## 2018-03-21 DIAGNOSIS — S83.212D BUCKET-HANDLE TEAR OF MEDIAL MENISCUS OF LEFT KNEE AS CURRENT INJURY, SUBSEQUENT ENCOUNTER: ICD-10-CM

## 2018-03-21 PROCEDURE — 99024 POSTOP FOLLOW-UP VISIT: CPT | Performed by: ORTHOPAEDIC SURGERY

## 2018-03-21 RX ORDER — ACETAMINOPHEN AND CODEINE PHOSPHATE 300; 30 MG/1; MG/1
1 TABLET ORAL EVERY 4 HOURS PRN
Qty: 30 TABLET | Refills: 0 | Status: SHIPPED | OUTPATIENT
Start: 2018-03-21

## 2018-03-22 NOTE — PROGRESS NOTES
63 y o male  Left knee arthroscopy partial medial meniscectomy and associated procedures  Patient states the wound has discomfort in the pattern of the anterior band going from lateral to medial   States that he does have some swelling at times  Review of Systems  Review of systems negative unless otherwise specified in HPI    Past Medical History  Past Medical History:   Diagnosis Date    A-fib [facility])     Acquired solitary kidney     from birth   Maximo Polio Gilbert's syndrome     Groin abscess     current        abnormal labs per patient    Henry's syndrome     Scoliosis        Past Surgical History  Past Surgical History:   Procedure Laterality Date    CARDIAC SURGERY      mitral valve' pericardial window    CHOLECYSTECTOMY      HERNIA REPAIR      [place] KNEE SCOPE,MED/LAT MENISECTOMY Left 1/25/2018    Procedure: KNEE ARTHROSCOPY, PARTIAL MEDIAL MENISCECTOMY, CHONDROPLASTY;  Surgeon: Lolis Ugarte MD;  [facility];  Service: Orthopedics    ROTATOR CUFF REPAIR         Current Medications  Current Outpatient Prescriptions on File Prior to Visit   Medication Sig Dispense Refill    acetaminophen (TYLENOL) 500 mg tablet Take 500 mg by mouth every 6 (six) hours as needed for mild pain      metoprolol succinate (TOPROL-XL) 50 mg 24 hr tablet Take 25 mg by mouth daily      Multiple Vitamin (MULTIVITAMIN) tablet Take 1 tablet by mouth daily      warfarin (JANTOVEN) 2 mg tablet Take 6 mg by mouth daily      enoxaparin (LOVENOX) 80 mg/0 8 mL Inject 80 mg under the skin 2 (two) times a day 3 days preop      oxyCODONE (ROXICODONE) 5 mg immediate release tablet Take 1-2 tablets every 4-6 hrs as needed for pain control 30 tablet 0     No current facility-administered medications on file prior to visit          Recent Labs [facility])    0  Lab Value Date/Time   INR 0 97 01/25/2018 0819         Physical exam  [unfilled]    Left knee:   Incision sites well healed   Minimal effusion  Medial lateral joint line tenderness  Stable to coronal and sagittal plane stress  Positive patellar grind test  Neurologically and vascularly intact distally    Imaging      Procedure      Assessment/Plan:   62 y o male  Status post left knee arthroscopy partial medial meniscectomy  Plan is as follows:    Weightbearing as tolerated    Physical therapy    Follow-up in 4 weeks at this time patient continues to be symptomatic, I will give low-dose steroid injection prior to returning back to work    Discussed treatment plan with patient and  he is in agreement treatment plan    Thank you

## 2018-03-26 ENCOUNTER — TELEPHONE (OUTPATIENT)
Dept: OBGYN CLINIC | Facility: HOSPITAL | Age: 63
End: 2018-03-26

## 2018-03-26 NOTE — TELEPHONE ENCOUNTER
[employer] is calling letting us know they need a current work status for this patient as of his last visit       Tonie gardner    Fax- [phone]

## 2018-03-26 NOTE — TELEPHONE ENCOUNTER
Alka Lebron called from [facility]  She is questioning if the pt really needs work hardening or is work conditioning okay? If he needs work hardening then he will have to be sent to another facility because the one he is currently using doesn't do work hardening   Alka Lebron can be reached at [phone] or fax [phone]

## 2018-03-30 DIAGNOSIS — Z98.890 STATUS POST SURGERY: Primary | ICD-10-CM

## 2018-04-25 ENCOUNTER — OFFICE VISIT (OUTPATIENT)
Dept: OBGYN CLINIC | Facility: MEDICAL CENTER | Age: 63
End: 2018-04-25
Payer: OTHER MISCELLANEOUS

## 2018-04-25 VITALS
SYSTOLIC BLOOD PRESSURE: 120 MMHG | HEART RATE: 69 BPM | BODY MASS INDEX: 28.56 KG/M2 | RESPIRATION RATE: 20 BRPM | WEIGHT: 171.6 LBS | DIASTOLIC BLOOD PRESSURE: 73 MMHG

## 2018-04-25 DIAGNOSIS — Z98.890 STATUS POST SURGERY: Primary | ICD-10-CM

## 2018-04-25 PROCEDURE — 99024 POSTOP FOLLOW-UP VISIT: CPT | Performed by: ORTHOPAEDIC SURGERY

## 2018-04-25 NOTE — LETTER
April 25, 2018     Patient: Camilo Flaherty   YOB: 1955   Date of Visit: 4/25/2018       To Whom it May Concern:    Rosita Cui is under my professional care  He was seen in my office on 4/25/2018  He may return to work on April 30, 2018  No restrictions at this time  If you have any questions or concerns, please don't hesitate to call           Sincerely,          Ty Banegas MD        CC: Camilo Flaherty

## 2018-04-25 NOTE — PROGRESS NOTES
63 y o male presents for 3 months postoperative visit status post left arthroscopic partial medial meniscectomy  Patient states he has had significant improvements he has been doing work conditioning in physical therapy  He states having only intermittent bouts of pain in his left knee  He denies any limitations regarding his left knee at this time   Denies any clicking popping catching instability of his left knee    Review of Systems  Review of systems negative unless otherwise specified in HPI    Past Medical History  Past Medical History:   Diagnosis Date    A-fib [facility])     Acquired solitary kidney     from birth   [facility] Gilbert's syndrome     Groin abscess     current        abnormal labs per patient    Han[place]'s syndrome     Scoliosis        Past Surgical History  Past Surgical History:   Procedure Laterality Date    CARDIAC SURGERY      mitral valve' pericardial window    CHOLECYSTECTOMY      HERNIA REPAIR      [place] KNEE SCOPE,MED/LAT MENISECTOMY Left 1/25/2018    Procedure: KNEE ARTHROSCOPY, PARTIAL MEDIAL MENISCECTOMY, CHONDROPLASTY;  Surgeon: Gerhardt Fries, MD;  [facility];  Service: Orthopedics    ROTATOR CUFF REPAIR         Current Medications  Current Outpatient Prescriptions on File Prior to Visit   Medication Sig Dispense Refill    acetaminophen (TYLENOL) 500 mg tablet Take 500 mg by mouth every 6 (six) hours as needed for mild pain      metoprolol succinate (TOPROL-XL) 50 mg 24 hr tablet Take 25 mg by mouth daily      Multiple Vitamin (MULTIVITAMIN) tablet Take 1 tablet by mouth daily      warfarin (JANTOVEN) 2 mg tablet Take 6 mg by mouth daily      acetaminophen-codeine (TYLENOL #3) 300-30 mg per tablet Take 1 tablet by mouth every 4 (four) hours as needed for moderate pain 30 tablet 0    enoxaparin (LOVENOX) 80 mg/0 8 mL Inject 80 mg under the skin 2 (two) times a day 3 days preop      [DISCONTINUED] oxyCODONE (ROXICODONE) 5 mg immediate release tablet Take 1-2 tablets every 4-6 hrs as needed for pain control 30 tablet 0     No current facility-administered medications on file prior to visit          Recent Labs [facility])    0  Lab Value Date/Time   INR 0 97 01/25/2018 0819         Physical exam  · General: Awake, Alert, Oriented  · Eyes: Pupils equal, round and reactive to light  · Heart: regular rate and rhythm  · Lungs: No audible wheezing    left Knee exam  · Well-healed anterior lateral anterior medial incisions full active extension flexion greater than 120° no pain to palpation medial lateral joint line stable varus valgus stress negative anterior posterior drawer testing      Assessment:  Status post 3 months left knee arthroscopy partial medial meniscectomy doing well  Plan:  Weightbearing as tolerated left lower extremity  Case discussed with Dr Leslie Ritchie physical therapy work conditioning  Patient may return to work April 30, 2018  Follow-up in 6 weeks time for repeat evaluation

## 2018-06-06 ENCOUNTER — OFFICE VISIT (OUTPATIENT)
Dept: OBGYN CLINIC | Facility: MEDICAL CENTER | Age: 63
End: 2018-06-06
Payer: OTHER MISCELLANEOUS

## 2018-06-06 VITALS
HEART RATE: 69 BPM | BODY MASS INDEX: 27.26 KG/M2 | DIASTOLIC BLOOD PRESSURE: 58 MMHG | WEIGHT: 163.8 LBS | SYSTOLIC BLOOD PRESSURE: 109 MMHG

## 2018-06-06 DIAGNOSIS — M25.562 LEFT KNEE PAIN, UNSPECIFIED CHRONICITY: Primary | ICD-10-CM

## 2018-06-06 PROCEDURE — 99213 OFFICE O/P EST LOW 20 MIN: CPT | Performed by: ORTHOPAEDIC SURGERY

## 2018-06-06 NOTE — PROGRESS NOTES
63 y o male status post for hip months left knee arthroscopy partial meniscectomy doing very well  Patient states he has approximately 100% improved regarding his left knee  He is back to work full duty  Patient is occasionally having aching pain in his left knee with weather changes  Review of Systems  Review of systems negative unless otherwise specified in HPI    Past Medical History  Past Medical History:   Diagnosis Date    A-fib [facility])     Acquired solitary kidney     from birth   Vena Deanna Gilbert's syndrome     Groin abscess     current        abnormal labs per patient    [place]'s syndrome     Scoliosis        Past Surgical History  Past Surgical History:   Procedure Laterality Date    CARDIAC SURGERY      mitral valve' pericardial window    CHOLECYSTECTOMY      HERNIA REPAIR      [place] KNEE SCOPE,MED/LAT MENISECTOMY Left 1/25/2018    Procedure: KNEE ARTHROSCOPY, PARTIAL MEDIAL MENISCECTOMY, CHONDROPLASTY;  Surgeon: Bhavani Reeves MD;  [facility];  Service: Orthopedics    ROTATOR CUFF REPAIR         Current Medications  Current Outpatient Prescriptions on File Prior to Visit   Medication Sig Dispense Refill    acetaminophen (TYLENOL) 500 mg tablet Take 500 mg by mouth every 6 (six) hours as needed for mild pain      acetaminophen-codeine (TYLENOL #3) 300-30 mg per tablet Take 1 tablet by mouth every 4 (four) hours as needed for moderate pain 30 tablet 0    enoxaparin (LOVENOX) 80 mg/0 8 mL Inject 80 mg under the skin 2 (two) times a day 3 days preop      metoprolol succinate (TOPROL-XL) 50 mg 24 hr tablet Take 25 mg by mouth daily      Multiple Vitamin (MULTIVITAMIN) tablet Take 1 tablet by mouth daily      warfarin (JANTOVEN) 2 mg tablet Take 6 mg by mouth daily       No current facility-administered medications on file prior to visit          Recent Labs [facility])    0  Lab Value Date/Time   INR 0 97 01/25/2018 0819         Physical exam  · General: Awake, Alert, Oriented  · Eyes: Pupils equal, round and reactive to light  · Heart: regular rate and rhythm  · Lungs: No audible wheezing  · Examination the patient's left knee well-healed anterior medial and anterior lateral incisions full active extension flexion greater than 120° stable varus valgus stress no pain palpation medial lateral joint lines quadriceps strength 5/5 sensation tacked distal pulses present    Assessment/Plan:   62 y o male status post 4 and half months left knee arthroscopy partial meniscectomy doing well  Weightbearing as tolerated left lower extremity  Activities as tolerated  Follow-up an as-needed basis

## (undated) DEVICE — 3M™ IOBAN™ 2 ANTIMICROBIAL INCISE DRAPE 6640EZ: Brand: IOBAN™ 2

## (undated) DEVICE — INTENDED FOR TISSUE SEPARATION, AND OTHER PROCEDURES THAT REQUIRE A SHARP SURGICAL BLADE TO PUNCTURE OR CUT.: Brand: BARD-PARKER ® CARBON RIB-BACK BLADES

## (undated) DEVICE — NEEDLE 25G X 1 1/2

## (undated) DEVICE — GAUZE SPONGES,16 PLY: Brand: CURITY

## (undated) DEVICE — CURITY STRETCH BANDAGE: Brand: CURITY

## (undated) DEVICE — CHLORAPREP HI-LITE 26ML ORANGE

## (undated) DEVICE — IMPERVIOUS STOCKINETTE: Brand: DEROYAL

## (undated) DEVICE — PACK UNIVERSAL ARTHRSCOPY PBDS

## (undated) DEVICE — BLADE SHAVER DISSECTOR 3.5MM 13CM COOLCUT

## (undated) DEVICE — GLOVE SRG BIOGEL 8

## (undated) DEVICE — PADDING CAST 4 IN  COTTON STRL

## (undated) DEVICE — ARTHROSCOPY FLOOR MAT

## (undated) DEVICE — U-DRAPE: Brand: CONVERTORS

## (undated) DEVICE — SPONGE PVP SCRUB WING STERILE

## (undated) DEVICE — LIGHT GLOVE GREEN

## (undated) DEVICE — VAPR COOLPULSE 90 ELECTRODE 90 DEGREES SUCTION WITH INTEGRATED HANDPIECE: Brand: VAPR COOLPULSE

## (undated) DEVICE — OCCLUSIVE GAUZE STRIP,3% BISMUTH TRIBROMOPHENATE IN PETROLATUM BLEND: Brand: XEROFORM

## (undated) DEVICE — SUT ETHILON 3-0 FS-1 18 IN 663G

## (undated) DEVICE — NEEDLE 18 G X 1 1/2

## (undated) DEVICE — GLOVE INDICATOR PI UNDERGLOVE SZ 8.5 BLUE

## (undated) DEVICE — TUBING ARTHROSCOPIC WAVE  MAIN PUMP

## (undated) DEVICE — ACE WRAP 6 IN UNSTERILE

## (undated) DEVICE — ABDOMINAL PAD: Brand: DERMACEA